# Patient Record
Sex: MALE
[De-identification: names, ages, dates, MRNs, and addresses within clinical notes are randomized per-mention and may not be internally consistent; named-entity substitution may affect disease eponyms.]

---

## 2019-03-04 ENCOUNTER — HOSPITAL ENCOUNTER (OUTPATIENT)
Dept: HOSPITAL 97 - ENDO | Age: 54
Discharge: HOME | End: 2019-03-04
Attending: INTERNAL MEDICINE
Payer: SELF-PAY

## 2019-03-04 DIAGNOSIS — E66.3: ICD-10-CM

## 2019-03-04 DIAGNOSIS — R10.9: Primary | ICD-10-CM

## 2019-03-04 DIAGNOSIS — K64.8: ICD-10-CM

## 2019-03-04 DIAGNOSIS — Z83.3: ICD-10-CM

## 2019-03-04 DIAGNOSIS — Z79.84: ICD-10-CM

## 2019-03-04 DIAGNOSIS — K59.00: ICD-10-CM

## 2019-03-04 DIAGNOSIS — Z80.0: ICD-10-CM

## 2019-03-04 DIAGNOSIS — E11.9: ICD-10-CM

## 2019-03-04 PROCEDURE — 82962 GLUCOSE BLOOD TEST: CPT

## 2019-03-04 PROCEDURE — 0DJD8ZZ INSPECTION OF LOWER INTESTINAL TRACT, VIA NATURAL OR ARTIFICIAL OPENING ENDOSCOPIC: ICD-10-PCS

## 2019-03-05 NOTE — OP
Surgeon:  Anoop Leonard MD



Procedure To Be Performed:  Colonoscopy.



Performing Physician:  Anoop Leonard MD.



Indication For Procedure:  Screening.



Plan For Anesthesia:  Monitored anesthesia care.



Complexity:  Average.



Technique:  After obtaining informed consent from the patient and explaining risks and complications 
which include, but are not limited to bleeding, infection, perforation, and anesthesia complication, 
the patient was placed in left lateral position and sedation was given.  From then on, a digital rect
al exam was performed.  Scope was inserted into the rectum and carefully guided up to the terminal il
eum.  Subsequently, the scope gradually withdrawn while carefully examining the mucosa.  Scope withdr
awal time was 12 minutes.



Quality Of Prep:  Segmental poor.



Findings:  Digital rectal exam was normal.  No gross lesion seen from the rectum to the cecum.  The t
erminal ileum appeared normal.  Retroflexion revealed grade 1 internal hemorrhoids.



Complications:  None.



Tolerance To Anesthesia:  Excellent.



Postop Diagnosis:  No gross lesions seen on colonoscopy.  However, prep was not ideal.



Plan:  EGD as planned on the 22nd.  Follow up in the GI clinic after above.  Repeat colonoscopy in 3 
to 4 years due to limited prep.





US/MODL

DD:  03/04/2019 13:20:27Voice ID:  [id number]

DT:  03/05/2019 00:30:01Report ID:  [id number]